# Patient Record
(demographics unavailable — no encounter records)

---

## 2024-10-21 NOTE — CONSULT LETTER
[FreeTextEntry1] : Dear Dr. KESHAV COLORADO    I had the pleasure of evaluating your patient LEWIS DYSON, thank you for allowing us to participate in their care. please see full note detailing our visit below.  If you have any questions, please do not hesitate to call me and I would be happy to discuss further.       Yo Ron M.D.   Attending Physician,     Department of Otolaryngology - Head and Neck Surgery   [facility]    Office: [phone]   Fax: [phone]

## 2024-10-21 NOTE — REASON FOR VISIT
[Subsequent Evaluation] : a subsequent evaluation for [FreeTextEntry2] : sinus issues; colds, deviated spectrum on the left nostril

## 2024-10-21 NOTE — HISTORY OF PRESENT ILLNESS
[de-identified] : 60 year old female presents for evaluation of her sinuses. Within the past few months has had 2 sinus infections, bronchitis, and whooping cough, following up with pulm and immunologist. Within the past 12 months has had 4-5 courses of antibiotics for sinus infections, unable to tolerate steroids. Also with constant headaches. Doing saline rinse couple times a week for months. During the spring keeps the windows closed, checked for mold in her house. Has been allergy tested, positive for mold, trees. Breathing feels a bit obstructed when she lays on her right side. Had long term covid symptoms, went to covid therapy.  with moderate sleep apnea, using cpap. Tried dental appliance in the past.   Patient is following up after taking oral antibiotics and using nasal spray twice a day. States was unable to complete entire course of antibiotics as caused a flare up of colitis and taperd off at the end with one a day pills. has been on many courses this year. Denies improvement in breathing or pressure. states is still constantly stuffy. States has constant pressure at forehead and between eyes. Still using cpap.  Has been allergy tested with Dr. Boxer both blood panel and patch test - positive for trees, grasses, mold, pollen   [FreeTextEntry1] : Patient is following up to review CT scan of sinuses which showed mucosal thickening at sphenoids and maxillary sinuses. States in the mornings has a lot of pressure at eyes and forehead. States just started using CPAP and has been having nosebleeds for last week. States her nose is very tender and painful right now. Doing saline washes and flonase daily for last month.

## 2024-10-21 NOTE — END OF VISIT
[FreeTextEntry3] : I personally saw and examined  the patient in detail.  I spoke to ZAFAR Singletary regarding the assessment and plan of care. I performed the procedures and relevant physical exam.  I have reviewed the above assessment and plan of care and I agree.  I have made changes to the body of the note wherever necessary and appropriate

## 2024-10-21 NOTE — ASSESSMENT
[FreeTextEntry1] : 60 year old female presents for evaluation of her sinuses. On exam, L DNS, polypoid changes to face of middle turbinate left. Also with drying and chaffing, crusting along anterior septum.  CT scan from 10/04/24 showed overall mild chronic inflammatory changes throughout the paranasal sinuses  Discussed options: 1) continue conservative management with nasal sprays and washes, allergy avoidance and tx 2) office sinus procedure, septum  3) sleeping procedure, septum, turbs, valve, sinuses  - patient elected to continue with conservative management -  she states breathing is ok and facial pressure is not persistent, able to breathe through the nose  - continue Flonase. A topical steroid reduce mucosal swelling, illustrated appropriate use and how to reduce the risk of bleeding  - Nasal irrigation and showed how to use it to maximize effectiveness  - can try saline gel or ponaris oil   Also with sleep apnea. On exam, mild redundant palate, full BOT. Erythema and edema consistent with mild acid reflux. Sleep study 07/2024 showed AHI of 18 - will continue lifestyle regiment to reduce overproduction of acid and reduce laryngeal reflux including avoiding caffein, alcohol, eating before bed, spicy and fatty foods, and head elevation at night etc. Handout detailing regiment also given - continue with cpap, discussed having on humidifier feature on to reduce nasal drying and crusting   discussed nasal procedure may not resolve sleep apnea but may improve breathing issues

## 2024-10-21 NOTE — PROCEDURE
[FreeTextEntry6] : Procedure performed: Nasal Endoscopy- Diagnostic Pre-op indication(s): nasal congestion Post-op indication(s): nasal congestion  Verbal and/or written consent obtained from patient Anterior rhinoscopy insufficient to account for symptoms Scope #: 3,  flexible fiber optic telescope  The scope was introduced in the nasal passage between the middle and inferior turbinates to exam the inferior portion of the middle meatus and the fontanelle, as well as the maxillary ostia.  Next, the scope was passed medically and posteriorly to the middle turbinates to examine the sphenoethmoid recess and the superior turbinate region. Upon visualization the finders are as follows: Nasal Septum: left septal deviation Bilateral - Mucosa: boggy turbinates, Mucous: scant, Polyp: not seen, Inferior Turbinate: boggy, Middle Turbinate: polypoid left , Superior Turbinate: normal, Inferior Meatus: narrow, Middle Meatus: narrow, Super Meatus:normal, Sphenoethmoidal Recess: clear [de-identified] : Procedure performed: laryngeal Endoscopy- Diagnostic Pre-op/post op indication: dysphonia Verbal and/or written consent obtained from patient, Patient was unable to cooperate with mirror Scope #: 3, flexible fiber optic telescope used  Scope was introduced through the nose passed on the floor of the nose to the nasopharynx and then followed down the soft palate to the lower pharynx. The tongue Base, Larynx, Hypopharynx were examined. Base of tongue was symmetric, vallecular was clear, epiglottis was not deformed, subglottis/ pyriform and posterior pharyngeal walls were clear. No erythema, edema, pooling of secretions, masses or lesions. Airway patent, no foreign body visualized. Postcricoid area with moderate erythema and mild edema. + intra-artenoid bar. No pooling of secretions. True vocal cords, vestibular folds, ventricles, pyriform sinuses, and aryepiglottic folds appear normal bilaterally. Vocal cords mobile with good contact b/l. mild reflux mild redundant palate, full BOT

## 2024-10-31 NOTE — DISCUSSION/SUMMARY
[FreeTextEntry1] :  moderate 18 rdi 33  Obstructive sleep apnea syndrome presently tolerating CPAP. Chronic fatigue may be related to above. Recurrent respiratory infections of unclear etiology.  May be sinus in origin.  No recurrence over summer. no bronchiectasis on CT. linear and ongoing for couple densities stable to improved compared to 2022. Shortness of breath.  Does not appear to be related to significant pulmonary dysfunction.  Probable component related to conditioning.  Should see cardiology.  Has improved.

## 2024-10-31 NOTE — HISTORY OF PRESENT ILLNESS
[Never] : never [TextBox_4] : Has been seeing ENT for sinuses using new auto CPAP machine Humidifier is turned off. Wants to turn on. Only SOB with exercise. Positive chronic upper airway congeston. Albuterol use only with exercise  saw Dr Boxer told had elevated wbc. Saw hematology told fine.  no further respiratory infections        Hx of allergies and exercise induced asthma.  Positive fatigue was dx. mild-mod MCKAY used MAD for 2 months without response.  presently not coughing. Positive PHAM. PHAM 1-2 flights. Not exercising. No wheezing.   also has fatigue does see rheum saw 2 years ago and allergist and recently had labs, needs to go back for patch test.  has a rash, happens every summer. No etiology found.  recently had gall bladder removed in February Did have Covid prior to vaccine and had o2 desaturations and went to Pt after covid believes has sinus issues. Told significant.

## 2024-10-31 NOTE — PROCEDURE
[FreeTextEntry1] : Ct chest 6/28 /24 reviewed and discussed.  Scattered linear opacities.  No acute infiltrates.  No bronchiectasis.  CPAP data downloaded and discussed with patient  06/03/2024 Pulmonary function testing Normal Flow Rates Normal Lung Volumes. There is a mild diffusion impairment. Corrects to normal with lung volume correction  Compared to July 2022 no significant change in flow rates.  Mild increase in diffusion capacity.  1 / 1 Haim Gruber  Report date:4/24/2024 View Order (Report matches exam selected in Patient History pane)     ACC: [id number] EXAM: XR CHEST PA LAT 2V ORDERED BY: ASHLEY MORENO  PROCEDURE DATE: 04/23/2024    INTERPRETATION: CLINICAL HISTORY: cough fever.  COMPARISON: 11/10/2021.  TECHNIQUE: Frontal and lateral chest radiographs. Adequate positioning.  FINDINGS:  Support devices: None.  Cardiac/mediastinum/hilum: Unremarkable.  Lung parenchyma/Pleura: No focal parenchymal opacities, pleural effusions, or pneumothorax.  Skeleton/soft tissues: Stable.   IMPRESSION:  No radiographic evidence of acute cardiopulmonary disease.  --- End of Report ---      HAIM GRUBER MD; Attending Radiologist This document has been electronically signed. Apr 24 2024 12

## 2024-10-31 NOTE — PROCEDURE
[FreeTextEntry1] : Ct chest 6/28 /24 reviewed and discussed.  Scattered linear opacities.  No acute infiltrates.  No bronchiectasis.  CPAP data downloaded and discussed with patient  06/03/2024 Pulmonary function testing Normal Flow Rates Normal Lung Volumes. There is a mild diffusion impairment. Corrects to normal with lung volume correction  Compared to July 2022 no significant change in flow rates.  Mild increase in diffusion capacity.  1 / 1 Haim Gruber  Report date:4/24/2024 View Order (Report matches exam selected in Patient History pane)     ACC: [id number] EXAM: XR CHEST PA LAT 2V ORDERED BY: ASHLEY MORENO  PROCEDURE DATE: 04/23/2024    INTERPRETATION: CLINICAL HISTORY: cough fever.  COMPARISON: 11/10/2021.  TECHNIQUE: Frontal and lateral chest radiographs. Adequate positioning.  FINDINGS:  Support devices: None.  Cardiac/mediastinum/hilum: Unremarkable.  Lung parenchyma/Pleura: No focal parenchymal opacities, pleural effusions, or pneumothorax.  Skeleton/soft tissues: Stable.   IMPRESSION:  No radiographic evidence of acute cardiopulmonary disease.  --- End of Report ---      HAIM GRUBER MD; Attending Radiologist This document has been electronically signed. Apr 24 2024 12

## 2024-10-31 NOTE — ASSESSMENT
[FreeTextEntry1] : cont auto cpap 4-10 with nasal pillows p10 small  Patient compliant to APAP therapy and having positive clinical response to treatment. Continue present settings No indication for follow-up CT unless change in clinical status. Likely would benefit from cardiology evaluation. Follow-up in 6 months or sooner on appearing basis.

## 2025-03-18 NOTE — REASON FOR VISIT
[This encounter was initiated by telehealth (audio with video) and converted to telephone (audio only)] : This encounter was initiated by telehealth (audio with video) and converted to telephone (audio only) [Home] : at home, [unfilled] , at the time of the visit. [Medical Office: ([facility])___] : at the medical office located in  [Verbal consent obtained from patient] : the patient, [unfilled] [Routine Follow-Up] : a routine follow-up visit for

## 2025-03-18 NOTE — HISTORY OF PRESENT ILLNESS
[de-identified] : Patient  connected about concern for measles exposure - she would like to know if she is at risk for measles based upon her recurrent infection history -

## 2025-03-18 NOTE — ASSESSMENT
[FreeTextEntry1] : Recurrent infections:  She needs to go for post Pneumovax antibodies She will also obtain MMR antibodies   This visit was provided via telehealth using real time 2-way audio visual technology.  The patient, Geni George, was located at home, at the time of the visit.   The provider, Mitchell Boxer, M.D., was located at the office, [address] [place] [address], at the time of the visit.  The patient, Geni George and physician, Mitchell Boxer, M.D., participated in the telehealth encounter.  Verbal consent obtained by  from patient.  The majority of time (>50%) was spent on counseling and coordination of care with this patient and/or family member.  The diagnosis of recurrent infections

## 2025-03-18 NOTE — HISTORY OF PRESENT ILLNESS
[de-identified] : Patient  connected about concern for measles exposure - she would like to know if she is at risk for measles based upon her recurrent infection history -

## 2025-03-18 NOTE — ASSESSMENT
[FreeTextEntry1] : Recurrent infections:  She needs to go for post Pneumovax antibodies She will also obtain MMR antibodies   This visit was provided via telehealth using real time 2-way audio visual technology.  The patient, Geni George, was located at home, at the time of the visit.   The provider, Mitchell Boxer, M.D., was located at the office, [address] [place] [address], at the time of the visit.  The patient, Geni George and physician, Mitchell Boxer, M.D., participated in the telehealth encounter.  Verbal consent obtained by  from patient.  The majority of time (>50%) was spent on counseling and coordination of care with this patient and/or family member.  The diagnosis of recurrent infections

## 2025-03-18 NOTE — REASON FOR VISIT
[This encounter was initiated by telehealth (audio with video) and converted to telephone (audio only)] : This encounter was initiated by telehealth (audio with video) and converted to telephone (audio only) [Home] : at home, [unfilled] , at the time of the visit. [Medical Office: ([facility])___] : at the medical office located in  [Verbal consent obtained from patient] : the patient, [unfilled] [Routine Follow-Up] : a routine follow-up visit for

## 2025-04-29 NOTE — PROCEDURE
[FreeTextEntry1] : Ct chest 6/28 /24 reviewed and discussed.  Scattered linear opacities.  No acute infiltrates.  No bronchiectasis.  CPAP data downloaded and discussed with patient  06/03/2024 Pulmonary function testing Normal Flow Rates Normal Lung Volumes. There is a mild diffusion impairment. Corrects to normal with lung volume correction  Compared to July 2022 no significant change in flow rates.  Mild increase in diffusion capacity.  1 / 1 Haim Gruber  Report date:4/24/2024 View Order (Report matches exam selected in Patient History pane)     ACC: [id number] EXAM: XR CHEST PA LAT 2V ORDERED BY: ASHLEY MORENO  PROCEDURE DATE: 04/23/2024    INTERPRETATION: CLINICAL HISTORY: cough fever.  COMPARISON: 11/10/2021.  TECHNIQUE: Frontal and lateral chest radiographs. Adequate positioning.  FINDINGS:  Support devices: None.  Cardiac/mediastinum/hilum: Unremarkable.  Lung parenchyma/Pleura: No focal parenchymal opacities, pleural effusions, or pneumothorax.  Skeleton/soft tissues: Stable.   IMPRESSION:  No radiographic evidence of acute cardiopulmonary disease.  --- End of Report ---      HAIM GRUBER MD; Attending Radiologist This document has been electronically signed. Apr 24 2024 12

## 2025-04-29 NOTE — PROCEDURE
[FreeTextEntry1] : Ct chest 6/28 /24 reviewed and discussed.  Scattered linear opacities.  No acute infiltrates.  No bronchiectasis.  CPAP data downloaded and discussed with patient  06/03/2024 Pulmonary function testing Normal Flow Rates Normal Lung Volumes. There is a mild diffusion impairment. Corrects to normal with lung volume correction  Compared to July 2022 no significant change in flow rates.  Mild increase in diffusion capacity.  1 / 1 Haim Gruber  Report date:4/24/2024 View Order (Report matches exam selected in Patient History pane)     ACC: [id number] EXAM: XR CHEST PA LAT 2V ORDERED BY: ASHLEY MORENO  PROCEDURE DATE: 04/23/2024    INTERPRETATION: CLINICAL HISTORY: cough fever.  COMPARISON: 11/10/2021.  TECHNIQUE: Frontal and lateral chest radiographs. Adequate positioning.  FINDINGS:  Support devices: None.  Cardiac/mediastinum/hilum: Unremarkable.  Lung parenchyma/Pleura: No focal parenchymal opacities, pleural effusions, or pneumothorax.  Skeleton/soft tissues: Stable.   IMPRESSION:  No radiographic evidence of acute cardiopulmonary disease.  --- End of Report ---      HAIM GRUBER MD; Attending Radiologist This document has been electronically signed. Apr 24 2024 12

## 2025-04-29 NOTE — ASSESSMENT
[FreeTextEntry1] : cont auto cpap 4-10 with nasal pillows p10 small  Patient compliant to APAP therapy and having positive clinical response to treatment. Continue present settings No indication for follow-up CT unless change in clinical status.  Follow-up in 6 months or sooner on appearing basis.

## 2025-04-29 NOTE — DISCUSSION/SUMMARY
[FreeTextEntry1] :  moderate 18 rdi 33  Obstructive sleep apnea syndrome presently tolerating CPAP. Chronic fatigue may be related to above. Recurrent respiratory infections essentially resolved.  None recent.

## 2025-04-29 NOTE — HISTORY OF PRESENT ILLNESS
[Never] : never [TextBox_4] : Has been seeing ENT for sinuses using auto CPAP machine - has noticed last night that more air than usual was coming through  Humidifier is turned off. Wants to turn on. Only SOB with exercise. - on albuterol  Positive chronic upper airway congeston. Albuterol use only with exercise  saw Dr Boxer told had elevated wbc. Saw hematology told fine.  no further respiratory infections        Hx of allergies and exercise induced asthma.  Positive fatigue was dx. mild-mod MCKAY used MAD for 2 months without response.  presently not coughing. Positive PHAM. PHAM 1-2 flights. Not exercising. No wheezing.   also has fatigue does see rheum saw 2 years ago and allergist and recently had labs, needs to go back for patch test.  has a rash, happens every summer. No etiology found.  recently had gall bladder removed in February Did have Covid prior to vaccine and had o2 desaturations and went to Pt after covid believes has sinus issues. Told significant.